# Patient Record
Sex: FEMALE | Race: WHITE | Employment: STUDENT | URBAN - METROPOLITAN AREA
[De-identification: names, ages, dates, MRNs, and addresses within clinical notes are randomized per-mention and may not be internally consistent; named-entity substitution may affect disease eponyms.]

---
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
## 2024-01-10 NOTE — TELEPHONE ENCOUNTER
Well-controlled, continue current medications   Left 2nd message for Mother to let her know that Rema Farah has a UTI and it will be treated with Cefdinir. She needs to complete Cefdinir. I requested Mother to call back with update.     When Mother calls back please ask her how Rema Farah is feeling - any back

## (undated) NOTE — LETTER
VACCINE ADMINISTRATION RECORD  PARENT / GUARDIAN APPROVAL  Date: 2019  Vaccine administered to: Modesta Garcia     : 2000    MRN: [id number]    A copy of the appropriate Centers for Disease Control and Prevention Vaccine Information statement

## (undated) NOTE — MR AVS SNAPSHOT
[address] [place] [address] [address] [facility]  [phone]               Thank you for choosing us for your health care visit with Anai Piña. DO Jorge.   We are glad to serve you and happy to provide you Take 1 capsule (250 mg total) by mouth 3 (three) times daily. Commonly known as:  [address] 6000-100-3 MG-MG-UNIT Caps   Generic drug:  Cranberry-Vitamin C-Vitamin E   Take by mouth.            MARILYYTELY 236 g Solr   Generic Healthy nutrition starts as early as infancy with breastfeeding. Once your baby begins eating solid foods, introduce nutritious foods early on and often. Sometimes toddlers need to try a food 10 times before they actually accept and enjoy it.  It is also im

## (undated) NOTE — LETTER
19          Grant Desai  :  2000      To Whom It May Concern: This patient was seen in our office on 05/15/19 . Work status:   May return to work full-time, no restrictions      If this office may be of further assistance, please do no

## (undated) NOTE — MR AVS SNAPSHOT
[place] [address], [address] [address] [phone]               Thank you for choosing us for your health care visit with LUKE Madrid.   We are glad to serve you and happy to provide you w and getting into the bladder or kidney. Your child's urine specimen is being sent to the lab for additional testing, a urine culture, to see if bacteria is growing in your child's urine which would confirm a urinary tract infection.  We will know the fi Take 1 tablet (150 mg total) by mouth once. Commonly known as:  DIFLUCAN           GOLYTELY 236 g Solr   Generic drug:  PEG 3350-KCl-NaBcb-NaCl-NaSulf   Take 4,000 mL by mouth. hydrocortisone 0.5 % Crea   Apply 1 Application topically. Multistix Lot# [id number] Numeric    Multistix Expiration Date 9/2017 Date                     Visit [facility] online at  [email or web address]

## (undated) NOTE — LETTER
[facility] [employer] of Child Health Examination       Student's Name  Caryle Avena Birth Signature                                                                                                                                Title      MD                     Date  7/23/2019   Signature Female School   Grade Level/ID#  12th Grade   HEALTH HISTORY          TO BE COMPLETED AND SIGNED BY PARENT/GUARDIAN AND VERIFIED BY HEALTH CARE PROVIDER    ALLERGIES  (Food, drug, insect, other)  Pentobarbital; Phenobarbital MEDICATION  (List all prescribe reading) Dental:  ____Braces    ____Bridge    ____Plate    ____Other  Other concerns? Ear/Hearing problems? Yes   No  Information may be shared with appropriate personnel for health /educational purposes. Bone/Joint problem/injury/scoliosis?    Yes Hemoglobin or Hematocrit   Sickle Cell  (when indicated)     Urinalysis   Developmental Screening Tool     SYSTEM REVIEW Normal Comments/Follow-up/Needs  Normal Comments/Follow-up/Needs   Skin Yes  Endocrine Yes    Ears Yes                      Screen resu Date  7/23/2019   Address/Phone  [address] [facility]  [address] [facility]  [address] [facility] [address] [phone]   Rev 11/15